# Patient Record
Sex: FEMALE | Race: BLACK OR AFRICAN AMERICAN | NOT HISPANIC OR LATINO | Employment: FULL TIME | URBAN - METROPOLITAN AREA
[De-identification: names, ages, dates, MRNs, and addresses within clinical notes are randomized per-mention and may not be internally consistent; named-entity substitution may affect disease eponyms.]

---

## 2023-03-09 LAB — SARS-COV-2 RESULT: DETECTED

## 2023-11-08 DIAGNOSIS — E78.00 HIGH CHOLESTEROL: Primary | ICD-10-CM

## 2023-11-08 DIAGNOSIS — M79.671 RIGHT FOOT PAIN: ICD-10-CM

## 2023-11-10 ENCOUNTER — ANCILLARY PROCEDURE (OUTPATIENT)
Dept: RADIOLOGY | Facility: CLINIC | Age: 53
End: 2023-11-10
Payer: COMMERCIAL

## 2023-11-10 DIAGNOSIS — M79.671 RIGHT FOOT PAIN: ICD-10-CM

## 2023-11-10 PROCEDURE — 73620 X-RAY EXAM OF FOOT: CPT | Mod: RIGHT SIDE | Performed by: RADIOLOGY

## 2023-11-10 PROCEDURE — 73620 X-RAY EXAM OF FOOT: CPT | Mod: RT

## 2023-11-19 ENCOUNTER — HOSPITAL ENCOUNTER (OUTPATIENT)
Dept: RADIOLOGY | Facility: HOSPITAL | Age: 53
Discharge: HOME | End: 2023-11-19
Payer: COMMERCIAL

## 2023-11-19 DIAGNOSIS — E78.00 HIGH CHOLESTEROL: ICD-10-CM

## 2023-11-19 PROCEDURE — 75571 CT HRT W/O DYE W/CA TEST: CPT

## 2024-05-30 ENCOUNTER — OFFICE VISIT (OUTPATIENT)
Dept: PRIMARY CARE | Facility: HOSPITAL | Age: 54
End: 2024-05-30
Payer: COMMERCIAL

## 2024-05-30 ENCOUNTER — PHARMACY VISIT (OUTPATIENT)
Dept: PHARMACY | Facility: CLINIC | Age: 54
End: 2024-05-30
Payer: COMMERCIAL

## 2024-05-30 VITALS
TEMPERATURE: 97.5 F | DIASTOLIC BLOOD PRESSURE: 84 MMHG | SYSTOLIC BLOOD PRESSURE: 148 MMHG | HEART RATE: 68 BPM | OXYGEN SATURATION: 100 % | WEIGHT: 236.8 LBS | BODY MASS INDEX: 43.58 KG/M2 | HEIGHT: 62 IN

## 2024-05-30 DIAGNOSIS — J06.9 VIRAL URI WITH COUGH: Primary | ICD-10-CM

## 2024-05-30 DIAGNOSIS — J02.9 SORE THROAT: ICD-10-CM

## 2024-05-30 PROCEDURE — 99212 OFFICE O/P EST SF 10 MIN: CPT | Performed by: STUDENT IN AN ORGANIZED HEALTH CARE EDUCATION/TRAINING PROGRAM

## 2024-05-30 PROCEDURE — RXMED WILLOW AMBULATORY MEDICATION CHARGE

## 2024-05-30 PROCEDURE — 99212 OFFICE O/P EST SF 10 MIN: CPT | Mod: GC | Performed by: STUDENT IN AN ORGANIZED HEALTH CARE EDUCATION/TRAINING PROGRAM

## 2024-05-30 PROCEDURE — 1036F TOBACCO NON-USER: CPT | Performed by: STUDENT IN AN ORGANIZED HEALTH CARE EDUCATION/TRAINING PROGRAM

## 2024-05-30 RX ORDER — LEVALBUTEROL TARTRATE 45 UG/1
1-2 AEROSOL, METERED ORAL EVERY 6 HOURS PRN
COMMUNITY
Start: 2024-04-09

## 2024-05-30 RX ORDER — OMEPRAZOLE 20 MG/1
20 CAPSULE, DELAYED RELEASE ORAL
COMMUNITY
Start: 2014-02-07

## 2024-05-30 RX ORDER — ROSUVASTATIN CALCIUM 40 MG/1
1 TABLET, COATED ORAL NIGHTLY
COMMUNITY
Start: 2024-03-15

## 2024-05-30 RX ORDER — METOPROLOL SUCCINATE 100 MG/1
100 TABLET, EXTENDED RELEASE ORAL DAILY
COMMUNITY
Start: 2024-02-16 | End: 2025-02-16

## 2024-05-30 RX ORDER — FUROSEMIDE 40 MG/1
1 TABLET ORAL DAILY
COMMUNITY
Start: 2014-09-24

## 2024-05-30 RX ORDER — HYDROQUINONE 40 MG/G
CREAM TOPICAL 2 TIMES DAILY
COMMUNITY
Start: 2024-02-09

## 2024-05-30 RX ORDER — POTASSIUM CHLORIDE 20 MEQ/1
20 TABLET, EXTENDED RELEASE ORAL DAILY
COMMUNITY
Start: 2024-04-23

## 2024-05-30 RX ORDER — LIDOCAINE 40 MG/G
CREAM TOPICAL 3 TIMES DAILY PRN
COMMUNITY
Start: 2023-08-09

## 2024-05-30 RX ORDER — LORATADINE 10 MG/1
10 TABLET ORAL DAILY
COMMUNITY
Start: 2024-02-10

## 2024-05-30 RX ORDER — MONTELUKAST SODIUM 10 MG/1
10 TABLET ORAL NIGHTLY
COMMUNITY

## 2024-05-30 RX ORDER — GLUCOSAM/CHONDRO/HERB 149/HYAL 750-100 MG
2000 TABLET ORAL 2 TIMES DAILY
COMMUNITY
Start: 2023-08-15

## 2024-05-30 RX ORDER — IBUPROFEN 800 MG/1
800 TABLET ORAL EVERY 6 HOURS PRN
COMMUNITY
Start: 2024-04-09

## 2024-05-30 RX ORDER — EZETIMIBE 10 MG/1
1 TABLET ORAL DAILY
COMMUNITY
Start: 2023-09-15 | End: 2024-09-15

## 2024-05-30 RX ORDER — NITROGLYCERIN 0.4 MG/1
0.4 TABLET SUBLINGUAL EVERY 5 MIN PRN
COMMUNITY
Start: 2014-09-24

## 2024-05-30 RX ORDER — VIT C/E/ZN/COPPR/LUTEIN/ZEAXAN 250MG-90MG
50 CAPSULE ORAL DAILY
COMMUNITY
Start: 2024-04-23 | End: 2024-11-27

## 2024-05-30 RX ORDER — FLUTICASONE PROPIONATE 50 MCG
2 SPRAY, SUSPENSION (ML) NASAL DAILY
COMMUNITY
Start: 2023-11-27 | End: 2024-11-27

## 2024-05-30 RX ORDER — SIMETHICONE 80 MG
80 TABLET,CHEWABLE ORAL EVERY 6 HOURS PRN
COMMUNITY
Start: 2024-04-29 | End: 2025-04-30

## 2024-05-30 RX ORDER — SPIRONOLACTONE 25 MG/1
1 TABLET ORAL 2 TIMES DAILY
COMMUNITY
Start: 2014-02-19 | End: 2025-03-16

## 2024-05-30 RX ORDER — TRAMADOL HYDROCHLORIDE 50 MG/1
1 TABLET ORAL EVERY 6 HOURS PRN
COMMUNITY
Start: 2015-06-01

## 2024-05-30 ASSESSMENT — PATIENT HEALTH QUESTIONNAIRE - PHQ9
1. LITTLE INTEREST OR PLEASURE IN DOING THINGS: NOT AT ALL
SUM OF ALL RESPONSES TO PHQ9 QUESTIONS 1 AND 2: 0
2. FEELING DOWN, DEPRESSED OR HOPELESS: NOT AT ALL

## 2024-05-30 ASSESSMENT — ENCOUNTER SYMPTOMS
OCCASIONAL FEELINGS OF UNSTEADINESS: 0
DEPRESSION: 0
LOSS OF SENSATION IN FEET: 0

## 2024-05-30 ASSESSMENT — PAIN SCALES - GENERAL: PAINLEVEL: 0-NO PAIN

## 2024-05-30 NOTE — PROGRESS NOTES
"Primary Care Internal Medicine Clinic Note     Subjective   Here for urgent same-day visit for sore throat.    Started this morning at 2am, Pain with swallowing   Taking motrin and tylenol  Pain is improved with pain meds and tea   Pain in the right ear as well   Cough from drainage d/t congestion   No fevers, does feel hot sometimes  No new joint pains  No SOB    With her 3yo nephew a couple days ago and he had a fever the next day, treating him for croup and AOM    Has second part of stress test tomorrow so limited in what she can eat    Strep rapid test was negative in clinic today   Never had GAS before    Not drinking or eating as much as usual  Urinated once today   Held lasix because worried about dehydration          Objective   Visit Vitals  /84   Pulse 68   Temp 36.4 °C (97.5 °F) (Temporal)   Ht 1.562 m (5' 1.5\")   Wt 107 kg (236 lb 12.8 oz)   SpO2 100%   BMI 44.02 kg/m²   Smoking Status Never   BSA 2.15 m²       Physical exam:  Gen: well-appearing, NAD  Head and neck: NCAT, neck supple without LAD  HEENT: MMM, normal nose with congestion, TMs pearly gray bilaterally   CV: Regular rate  Pulm: normal WOB on RA  Ext: WWP, no LE edema  Neuro: alert and oriented x3, normal tone, face symmetric, moves all extremities spontaneously      Medications:  Current Outpatient Medications   Medication Instructions    benzocaine (HurriCaine) 20 % 1 spray, Mouth/Throat, 4 times daily PRN    cholecalciferol (VITAMIN D-3) 50 mcg, oral, Daily    cyclobenzaprine HCl (CYCLOBENZAPRINE ORAL) 10 mg, oral, As needed    dextromethorphan-guaifenesin (Mucinex DM)  mg 12 hr tablet 1 tablet, oral, Every 12 hours PRN, Do not crush, chew, or split.    ezetimibe (Zetia) 10 mg tablet 1 tablet, oral, Daily    fluticasone (Flonase) 50 mcg/actuation nasal spray 2 sprays, Each Nostril, Daily    furosemide (Lasix) 40 mg tablet 1 tablet, oral, Daily    hydroquinone 4 % cream Topical, 2 times daily    ibuprofen 800 mg, oral, Every 6 " hours PRN    lidocaine 4 % cream Topical, 3 times daily PRN    loratadine (CLARITIN) 10 mg, oral, Daily    metoprolol succinate XL (TOPROL-XL) 100 mg, oral, Daily    montelukast (SINGULAIR) 10 mg, oral, Nightly    nitroglycerin (NITROSTAT) 0.4 mg, sublingual, Every 5 min PRN    omega 3-dha-epa-fish oil (Fish OiL) 1,000 mg (120 mg-180 mg) capsule 2,000 mg, oral, 2 times daily    omeprazole (PRILOSEC) 20 mg, oral, Daily before breakfast    potassium chloride CR 20 mEq ER tablet 20 mEq, oral, Daily    rosuvastatin (Crestor) 40 mg tablet 1 tablet, oral, Nightly    simethicone (MYLICON) 80 mg, oral, Every 6 hours PRN    sodium chloride (Ocean) 0.65 % nasal spray 1 spray, Each Nostril, As needed    spironolactone (Aldactone) 25 mg tablet 1 tablet, oral, 2 times daily    traMADol (Ultram) 50 mg tablet 1 tablet, oral, Every 6 hours PRN    vitamin E, dl,tocopheryl acet, (VITAMIN E, DL, ACETATE, ORAL) 1 tablet, oral, Daily    Xopenex HFA 45 mcg/actuation inhaler 1-2 puffs, inhalation, Every 6 hours PRN       Allergies:  Allergies   Allergen Reactions    Benazepril Anaphylaxis, Cough and Swelling    Bacitracin Zinc-Polymyxin B Hives and Other    Lisinopril Cough and Other    Oxycodone-Acetaminophen Constipation and Itching    Propoxyphene N-Acetaminophen Hallucinations    Propoxyphene-Acetaminophen Psychosis    Tuberculin, Purified Protein Derivative Other    Codeine Rash, GI Upset, Headache, Nausea Only, Other and Nausea/vomiting    Hydromorphone Hallucinations, Itching and Rash    Iodinated Contrast Media Hives and Rash    Meperidine Hives and Other    Oxycodone Rash    Propoxyphene Rash    Typhoid Vaccines Itching, Other and Rash             Assessment/Plan    53 y.o. female with HTN, asthma, and NICK who presents for same-day visit for sore throat.   No signs/symptoms of bacterial infection. Rapid strep neg in office today. Pain well controlled with tylenol/ibuprofen. Patient is well-appearing, well-hydrated, and tolerating  PO fluids. She cannot use NSAIDs because of her stress test tomorrow, so will rx benzocaine spray. Recommended against using alpha agonists given her HTN history. Return precautions (double worsening, SOB, lasting longer than 10 days, severe dehydration) were discussed.    She appears euvolemic. She held her lasix today which was wise. Recommend she push fluids for UOP 3-4 times per day. She can hold lasix today and tomorrow but should watch her volume status. She is comfortable judging when she needs it.     Seen with attending Dr. López Burnett MD  Med-Peds Resident, PGY-3  [facility]  P [phone]  F [phone]

## 2024-05-30 NOTE — PATIENT INSTRUCTIONS
This is most likely a viral URI with cough.     Keep hydrated with fluids. Aim to go pee at least 3-4 times per day. Take tylenol 650 mg every 6 hours and use the throat spray as needed. Avoid pseudoephedrine and phenylephrine - they will raise your blood pressure. Use mucinex DM instead. I expect this illness to last 4-6 days.     Please call if symptoms last longer than 10 days - we may need to give you antibiotics.     Go to the ED if you develop trouble breathing, are not able to keep fluids down or any acute worsening of symptoms.

## 2024-06-01 RX ORDER — FLUCONAZOLE 150 MG/1
150 TABLET ORAL ONCE
Qty: 1 TABLET | Refills: 0 | Status: SHIPPED | OUTPATIENT
Start: 2024-06-01 | End: 2024-06-01

## 2024-06-01 RX ORDER — AMOXICILLIN AND CLAVULANATE POTASSIUM 875; 125 MG/1; MG/1
875 TABLET, FILM COATED ORAL 2 TIMES DAILY
Qty: 20 TABLET | Refills: 0 | Status: SHIPPED | OUTPATIENT
Start: 2024-06-01 | End: 2024-06-01 | Stop reason: SDUPTHER

## 2024-06-01 RX ORDER — FLUCONAZOLE 150 MG/1
150 TABLET ORAL ONCE
Qty: 1 TABLET | Refills: 0 | Status: SHIPPED | OUTPATIENT
Start: 2024-06-01 | End: 2024-06-01 | Stop reason: SDUPTHER

## 2024-06-01 RX ORDER — AMOXICILLIN AND CLAVULANATE POTASSIUM 875; 125 MG/1; MG/1
875 TABLET, FILM COATED ORAL 2 TIMES DAILY
Qty: 20 TABLET | Refills: 0 | Status: SHIPPED | OUTPATIENT
Start: 2024-06-01 | End: 2024-06-11

## 2024-06-05 NOTE — PROGRESS NOTES
I saw and evaluated the patient. I personally obtained the key and critical portions of the history and physical exam or was physically present for key and critical portions performed by the resident/fellow. I reviewed the resident/fellow's documentation and discussed the patient with the resident/fellow. I agree with the resident/fellow's medical decision making as documented in the note.    Saadia Villalta MD MPH

## 2024-06-12 ENCOUNTER — LAB (OUTPATIENT)
Dept: LAB | Facility: LAB | Age: 54
End: 2024-06-12
Payer: COMMERCIAL

## 2024-06-12 DIAGNOSIS — I10 HYPERTENSION, UNSPECIFIED TYPE: ICD-10-CM

## 2024-06-12 DIAGNOSIS — I10 HYPERTENSION, UNSPECIFIED TYPE: Primary | ICD-10-CM

## 2024-06-12 LAB — CRP SERPL-MCNC: 0.56 MG/DL

## 2024-06-12 PROCEDURE — 86140 C-REACTIVE PROTEIN: CPT

## 2024-06-19 ENCOUNTER — APPOINTMENT (OUTPATIENT)
Dept: OPHTHALMOLOGY | Facility: CLINIC | Age: 54
End: 2024-06-19
Payer: COMMERCIAL

## 2024-06-19 DIAGNOSIS — H52.13 MYOPIA OF BOTH EYES: Primary | ICD-10-CM

## 2024-06-19 DIAGNOSIS — H52.223 REGULAR ASTIGMATISM OF BOTH EYES: ICD-10-CM

## 2024-06-19 DIAGNOSIS — H52.4 PRESBYOPIA: ICD-10-CM

## 2024-06-19 ASSESSMENT — ENCOUNTER SYMPTOMS
ENDOCRINE NEGATIVE: 0
CONSTITUTIONAL NEGATIVE: 0
EYES NEGATIVE: 0
NEUROLOGICAL NEGATIVE: 0
GASTROINTESTINAL NEGATIVE: 0
HEMATOLOGIC/LYMPHATIC NEGATIVE: 0
PSYCHIATRIC NEGATIVE: 0
CARDIOVASCULAR NEGATIVE: 0
ALLERGIC/IMMUNOLOGIC NEGATIVE: 0
RESPIRATORY NEGATIVE: 0
MUSCULOSKELETAL NEGATIVE: 0

## 2024-06-19 ASSESSMENT — TONOMETRY
OD_IOP_MMHG: 13
IOP_METHOD: GOLDMANN APPLANATION
OS_IOP_MMHG: 14

## 2024-06-19 ASSESSMENT — CUP TO DISC RATIO
OS_RATIO: 0.3
OD_RATIO: 0.25

## 2024-06-19 ASSESSMENT — REFRACTION
OS_ADD: +2.00
OD_AXIS: 075
OD_SPHERE: -4.75
OS_SPHERE: -3.75
OS_AXIS: 090
OD_ADD: +2.00
OS_CYLINDER: -0.75
OD_CYLINDER: -0.50

## 2024-06-19 ASSESSMENT — EXTERNAL EXAM - RIGHT EYE: OD_EXAM: NORMAL

## 2024-06-19 ASSESSMENT — VISUAL ACUITY
OD_CC+: -1
CORRECTION_TYPE: GLASSES
METHOD: SNELLEN - LINEAR
OD_CC: 20/20
OS_CC: 20/30

## 2024-06-19 ASSESSMENT — REFRACTION_WEARINGRX
OD_SPHERE: -5.25
OS_SPHERE: -4.25
OD_ADD: +1.75
OS_AXIS: 085
OS_CYLINDER: -0.50
OS_ADD: +1.75
OD_CYLINDER: -0.50
OD_AXIS: 080

## 2024-06-19 ASSESSMENT — CONF VISUAL FIELD
OD_INFERIOR_NASAL_RESTRICTION: 0
OS_SUPERIOR_TEMPORAL_RESTRICTION: 0
OS_INFERIOR_NASAL_RESTRICTION: 0
OS_SUPERIOR_NASAL_RESTRICTION: 0
OS_INFERIOR_TEMPORAL_RESTRICTION: 0
OD_NORMAL: 1
OD_SUPERIOR_NASAL_RESTRICTION: 0
OS_NORMAL: 1
METHOD: COUNTING FINGERS
OD_INFERIOR_TEMPORAL_RESTRICTION: 0
OD_SUPERIOR_TEMPORAL_RESTRICTION: 0

## 2024-06-19 ASSESSMENT — EXTERNAL EXAM - LEFT EYE: OS_EXAM: NORMAL

## 2024-06-19 ASSESSMENT — REFRACTION_MANIFEST
OD_AXIS: 075
OS_ADD: +2.00
OD_SPHERE: -4.50
OS_SPHERE: -3.50
OD_CYLINDER: -0.50
OS_CYLINDER: -0.75
OS_AXIS: 090
OD_ADD: +2.00

## 2024-06-19 ASSESSMENT — SLIT LAMP EXAM - LIDS
COMMENTS: CLEAN AND CLEAR
COMMENTS: CLEAN AND CLEAR

## 2024-06-19 NOTE — PROGRESS NOTES
Assessment/Plan   Diagnoses and all orders for this visit:  Myopia of both eyes  Regular astigmatism of both eyes  Presbyopia  New spec rx released today per patient request. Ocular health wnl for age OU. Monitor 1 year or sooner prn. Refraction billed today.  Ok to take glasses off for reading.

## 2024-08-13 DIAGNOSIS — I10 HYPERTENSION, UNSPECIFIED TYPE: Primary | ICD-10-CM

## 2024-08-15 ENCOUNTER — LAB (OUTPATIENT)
Dept: LAB | Facility: LAB | Age: 54
End: 2024-08-15
Payer: COMMERCIAL

## 2024-08-15 DIAGNOSIS — I10 HYPERTENSION, UNSPECIFIED TYPE: ICD-10-CM

## 2024-08-15 LAB — CRP SERPL-MCNC: 0.35 MG/DL

## 2024-08-15 PROCEDURE — 86140 C-REACTIVE PROTEIN: CPT

## 2024-10-29 DIAGNOSIS — I10 HYPERTENSION, UNSPECIFIED TYPE: Primary | ICD-10-CM

## 2024-10-31 ENCOUNTER — LAB (OUTPATIENT)
Dept: LAB | Facility: LAB | Age: 54
End: 2024-10-31
Payer: COMMERCIAL

## 2024-10-31 DIAGNOSIS — I10 HYPERTENSION, UNSPECIFIED TYPE: ICD-10-CM

## 2024-10-31 LAB — CRP SERPL-MCNC: 0.39 MG/DL

## 2024-10-31 PROCEDURE — 86140 C-REACTIVE PROTEIN: CPT

## 2025-01-20 ENCOUNTER — PHARMACY VISIT (OUTPATIENT)
Dept: PHARMACY | Facility: CLINIC | Age: 55
End: 2025-01-20
Payer: COMMERCIAL

## 2025-01-20 DIAGNOSIS — J45.901 EXACERBATION OF ASTHMA, UNSPECIFIED ASTHMA SEVERITY, UNSPECIFIED WHETHER PERSISTENT (HHS-HCC): Primary | ICD-10-CM

## 2025-01-20 PROCEDURE — RXMED WILLOW AMBULATORY MEDICATION CHARGE

## 2025-01-20 RX ORDER — IPRATROPIUM BROMIDE AND ALBUTEROL SULFATE 2.5; .5 MG/3ML; MG/3ML
3 SOLUTION RESPIRATORY (INHALATION) 4 TIMES DAILY PRN
Qty: 360 ML | Refills: 0 | Status: SHIPPED | OUTPATIENT
Start: 2025-01-20 | End: 2025-02-19

## 2025-01-25 ENCOUNTER — OFFICE VISIT (OUTPATIENT)
Dept: URGENT CARE | Age: 55
End: 2025-01-25
Payer: COMMERCIAL

## 2025-01-25 ENCOUNTER — APPOINTMENT (OUTPATIENT)
Dept: URGENT CARE | Age: 55
End: 2025-01-25
Payer: COMMERCIAL

## 2025-01-25 VITALS
RESPIRATION RATE: 20 BRPM | OXYGEN SATURATION: 95 % | BODY MASS INDEX: 44.18 KG/M2 | SYSTOLIC BLOOD PRESSURE: 158 MMHG | HEIGHT: 61 IN | HEART RATE: 78 BPM | WEIGHT: 234 LBS | DIASTOLIC BLOOD PRESSURE: 97 MMHG | TEMPERATURE: 99.9 F

## 2025-01-25 DIAGNOSIS — J06.9 VIRAL UPPER RESPIRATORY TRACT INFECTION: Primary | ICD-10-CM

## 2025-01-25 DIAGNOSIS — J40 BRONCHITIS: Primary | ICD-10-CM

## 2025-01-25 RX ORDER — PREDNISONE 20 MG/1
TABLET ORAL
Qty: 10 TABLET | Refills: 0 | Status: SHIPPED | OUTPATIENT
Start: 2025-01-25

## 2025-01-25 RX ORDER — DOXYCYCLINE 100 MG/1
100 CAPSULE ORAL 2 TIMES DAILY
Qty: 20 CAPSULE | Refills: 0 | Status: SHIPPED | OUTPATIENT
Start: 2025-01-25 | End: 2025-02-04

## 2025-01-25 RX ORDER — BENZONATATE 200 MG/1
200 CAPSULE ORAL 3 TIMES DAILY PRN
Qty: 21 CAPSULE | Refills: 0 | Status: SHIPPED | OUTPATIENT
Start: 2025-01-25

## 2025-01-25 ASSESSMENT — ENCOUNTER SYMPTOMS
MYALGIAS: 0
HEMOPTYSIS: 0
COUGH: 1
CARDIOVASCULAR NEGATIVE: 1
WHEEZING: 1
SHORTNESS OF BREATH: 0
SORE THROAT: 0
ENDOCRINE NEGATIVE: 1
EYES NEGATIVE: 1
ALLERGIC/IMMUNOLOGIC NEGATIVE: 1
FEVER: 0
HEMATOLOGIC/LYMPHATIC NEGATIVE: 1
SORE THROAT: 0
RHINORRHEA: 1
MUSCULOSKELETAL NEGATIVE: 1
SHORTNESS OF BREATH: 0
HEADACHES: 0
PSYCHIATRIC NEGATIVE: 1
CHILLS: 0
SWEATS: 0
CHEST TIGHTNESS: 1
COUGH: 1
HEARTBURN: 0
FEVER: 0
WEIGHT LOSS: 0
NEUROLOGICAL NEGATIVE: 1

## 2025-01-25 NOTE — PROGRESS NOTES
Urgent Care Virtual Video Visit    Patient Location: [facility] [place]  Provider Location: [facility]    Patient complains of a cough for about a week. She states that she has a duoneb without relief. She is worried about a chest infection. Her sinuses are yellow and dripping down into the chest and her asthma meds are not working. She takes medications as directed. PMH of diastolic heart failure and asthma. No current smoking.   She bringing up green mucous with the cough. +Wheezing. Denies sob. 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 Admits to soreness in her chest due to coughing.     Video visit completed with realtime synchronous video/audio connection. Informed consent was obtained from the patient. Patient was made aware that my evaluation and diagnosis are limited due to the fact that we are not in the same room during the interview and that this is a virtual encounter that took place via videoconferencing. Patient verbalized understanding.     Patient disposition: Sent to [facility] [facility] for further evaluation of possible pneumonia.     Electronically signed by Virtual Urgent Care  9:55 AM

## 2025-01-25 NOTE — PATIENT INSTRUCTIONS
Use your asthma inhaler and nebulizer rx as directed  Pcp follow up this week if not improving or worsening  ER visit anytime 24/7 for acute worsening or changing condition

## 2025-01-25 NOTE — PROGRESS NOTES
Subjective   Patient ID: Christine Reyes is a 54 y.o. female. They present today with a chief complaint of Cough (Chest congestion /1x wk) and Nasal Congestion.    History of Present Illness    History provided by:  Patient   used: No    Cough  Pertinent negatives include no ear pain, fever, sore throat or shortness of breath.     This is a 54 yr old female here for respiratory sxs. Cough productive of yellow/green sputum, chest congestion, and green nasal congestion x 1 week. No fever, dyspnea, sore throat or ear pain. Non smoker. Hx of asthma.  Past Medical History  Allergies as of 01/25/2025 - Reviewed 01/25/2025   Allergen Reaction Noted    Benazepril Anaphylaxis, Cough, and Swelling 01/29/2008    Bacitracin zinc-polymyxin b Hives and Other 05/30/2024    Lisinopril Cough and Other 09/02/2016    Oxycodone-acetaminophen Constipation and Itching 09/20/2011    Propoxyphene n-acetaminophen Hallucinations 05/30/2024    Propoxyphene-acetaminophen Psychosis 05/30/2024    Tuberculin, purified protein derivative Other 05/30/2024    Codeine Rash, GI Upset, Headache, Nausea Only, Other, and Nausea/vomiting 01/29/2008    Hydromorphone Hallucinations, Itching, and Rash 09/02/2016    Iodinated contrast media Hives and Rash 09/01/2020    Meperidine Hives and Other 10/06/2017    Oxycodone Rash 09/02/2016    Propoxyphene Rash 09/02/2016    Typhoid vaccines Itching, Other, and Rash 05/30/2024       (Not in a hospital admission)       Past Medical History:   Diagnosis Date    Abnormal cytological findings in specimens from other organs, systems and tissues     Pap smear, abnormal    Allergy status to unspecified drugs, medicaments and biological substances     History of seasonal allergies    Gestational (pregnancy-induced) hypertension without significant proteinuria, unspecified trimester ([facility])     Gestational hypertension    Myopia, unspecified eye 08/05/2016    Myopia with astigmatism and presbyopia     Otalgia, unspecified ear 11/10/2014    Chronic ear pain    Other conditions influencing health status     History of pregnancy    Other muscle spasm 11/10/2014    Neck muscle spasm    Personal history of diseases of the skin and subcutaneous tissue 11/10/2014    History of chronic eczema    Personal history of other complications of pregnancy, childbirth and the puerperium     History of spontaneous     Personal history of other diseases of the female genital tract     History of menorrhagia    Personal history of other diseases of the female genital tract     History of premenstrual syndrome    Personal history of other diseases of the female genital tract     Personal history of dysmenorrhea    Personal history of other diseases of the musculoskeletal system and connective tissue 11/10/2014    Personal history of gout    Personal history of other diseases of the nervous system and sense organs     History of sleep apnea    Personal history of other diseases of the respiratory system 11/10/2014    Personal history of asthma    Personal history of other endocrine, nutritional and metabolic disease     History of hypercholesterolemia    Postcoital and contact bleeding     Bleeding after intercourse     , unspecified weeks of gestation ([facility])     Premature births       Past Surgical History:   Procedure Laterality Date    BREAST SURGERY  04/15/2014    Breast Surgery Reduction Procedure    OTHER SURGICAL HISTORY  04/15/2014    Cervix Cryosurgery    OTHER SURGICAL HISTORY  2015    Hysterectomy Robotic-Assisted    TUBAL LIGATION  04/15/2014    Tubal Ligation        reports that she has never smoked. She has never used smokeless tobacco. She reports that she does not drink alcohol and does not use drugs.    Review of Systems  Review of Systems   Constitutional:  Negative for fever.   HENT:  Positive for congestion. Negative for ear pain and sore throat.    Eyes: Negative.    Respiratory:   "Positive for cough and chest tightness. Negative for shortness of breath.    Cardiovascular: Negative.    Endocrine: Negative.    Genitourinary: Negative.    Musculoskeletal: Negative.    Skin: Negative.    Allergic/Immunologic: Negative.    Neurological: Negative.    Hematological: Negative.    Psychiatric/Behavioral: Negative.     All other systems reviewed and are negative.      Objective    Vitals:    01/25/25 1017   BP: (!) 158/97   Pulse: 78   Resp: 20   Temp: 37.7 °C (99.9 °F)   TempSrc: Oral   SpO2: 95%   Weight: 106 kg (234 lb)   Height: 1.549 m (5' 1\")     No LMP recorded (exact date). Patient has had a hysterectomy.    Physical Exam  Vitals and nursing note reviewed.   Constitutional:       Appearance: Normal appearance.   HENT:      Head: Normocephalic and atraumatic.      Right Ear: Tympanic membrane and ear canal normal.      Left Ear: Tympanic membrane and ear canal normal.      Mouth/Throat:      Mouth: Mucous membranes are moist.      Pharynx: Oropharynx is clear.   Cardiovascular:      Rate and Rhythm: Normal rate and regular rhythm.   Pulmonary:      Effort: Pulmonary effort is normal.      Breath sounds: Normal breath sounds.   Musculoskeletal:      Cervical back: Neck supple.   Lymphadenopathy:      Cervical: No cervical adenopathy.   Skin:     General: Skin is warm and dry.   Neurological:      General: No focal deficit present.      Mental Status: She is alert and oriented to person, place, and time.   Psychiatric:         Mood and Affect: Mood normal.         Behavior: Behavior normal.       Procedures    Point of Care Test & Imaging Results from this visit  No results found for this visit on 01/25/25.   No results found.    Diagnostic study results (if any) were reviewed by Ingrid Pitts PA-C.    Assessment/Plan   Allergies, medications, history, and pertinent labs/EKGs/Imaging reviewed by Ingrid Pitts PA-C.       Orders and Diagnoses  Diagnoses and all orders for this " visit:  Bronchitis  -     doxycycline (Monodox) 100 mg capsule; Take 1 capsule (100 mg) by mouth 2 times a day for 10 days. Take with at least 8 ounces (large glass) of water, do not lie down for 30 minutes after  -     predniSONE (Deltasone) 20 mg tablet; 2 po daily x 5 days  -     benzonatate (Tessalon) 200 mg capsule; Take 1 capsule (200 mg) by mouth 3 times a day as needed for cough. Do not crush or chew.    Plan:  Meds as directed  Use your asthma meds as directed  Fluids and rest  Pcp follow up this week if not improving or worsening  ER visit anytime 24/7 for acute worsening or changing condition    Patient disposition: Home    Electronically signed by Ingrid Pitts PA-C  11:20 AM

## 2025-03-18 ENCOUNTER — HOSPITAL ENCOUNTER (OUTPATIENT)
Dept: RADIOLOGY | Facility: HOSPITAL | Age: 55
Discharge: HOME | End: 2025-03-18
Payer: COMMERCIAL

## 2025-03-18 DIAGNOSIS — M79.641 RIGHT HAND PAIN: ICD-10-CM

## 2025-03-18 DIAGNOSIS — M79.641 RIGHT HAND PAIN: Primary | ICD-10-CM

## 2025-03-18 PROCEDURE — 73130 X-RAY EXAM OF HAND: CPT | Mod: RT

## 2025-03-31 ENCOUNTER — OFFICE VISIT (OUTPATIENT)
Dept: ENDOCRINOLOGY | Facility: HOSPITAL | Age: 55
End: 2025-03-31
Payer: COMMERCIAL

## 2025-03-31 VITALS
WEIGHT: 238 LBS | HEART RATE: 60 BPM | OXYGEN SATURATION: 100 % | BODY MASS INDEX: 43.79 KG/M2 | SYSTOLIC BLOOD PRESSURE: 156 MMHG | DIASTOLIC BLOOD PRESSURE: 90 MMHG | HEIGHT: 62 IN | TEMPERATURE: 95.9 F

## 2025-03-31 DIAGNOSIS — E23.6 EMPTY SELLA (MULTI): Primary | ICD-10-CM

## 2025-03-31 PROCEDURE — 99214 OFFICE O/P EST MOD 30 MIN: CPT | Performed by: STUDENT IN AN ORGANIZED HEALTH CARE EDUCATION/TRAINING PROGRAM

## 2025-03-31 PROCEDURE — 3008F BODY MASS INDEX DOCD: CPT | Performed by: STUDENT IN AN ORGANIZED HEALTH CARE EDUCATION/TRAINING PROGRAM

## 2025-03-31 PROCEDURE — 99204 OFFICE O/P NEW MOD 45 MIN: CPT | Performed by: STUDENT IN AN ORGANIZED HEALTH CARE EDUCATION/TRAINING PROGRAM

## 2025-03-31 ASSESSMENT — PAIN SCALES - GENERAL: PAINLEVEL_OUTOF10: 5

## 2025-03-31 NOTE — PROGRESS NOTES
Subjective   Patient ID: Christine Reyes is a 54 y.o. female who presents for New Patient Visit.  HPI  Mrs. King with hx of HFpEF, Afib, Asthma, GERD, HTN?, HLD, hx of low potassium (now on spironolactone), diverticulosis and diverticulitis  Hx of bronchitis and pneumonia  Had recent bronchitis? and recent abx and Gcs. Prednisone 20 mg 2 tabs daily for 5 days. Receives GC oral once year approximately  Hasn't needed her inhaler since the infection in Jan/Feb    Headache: Once a month more temporal are towards the back usually couple days motrin and tylenol helps  Had floaters no with improving   Double vision: No  Peripheral vision: Not changes  Galactorrhea: No  Energy: Tired, loves naps. If in a car if not driving she would fall asleep  Sleep: Has sleep apnea. Not using her CPAP recently.   Weight: No major change in weight. No recent water pill.   Hold around 10 lbs fluids  On spironolactone twice a day  Was told she has high aldosterone  Had pregnancy induced HTN was started on hydrochlorothiazide.   Had prior Cts no reports showing adrenal issues  BM: Constipation, had colonoscopy after prep was not cleared miralax and metamucil  Appetite: Not good. Does not feel hungry. Hiatal hernia and umbilical hernia. Does not want to eat. Recent getting worse.   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 Takes Vitamin D 10,000IU once daily  Hot flashes: No  Night sweats: No  Had hysterectomy a while ago.  Still has her ovaries    Meds:  Reviewed  Vitamin D  Vitamin C   CoQ10  Black seed oil    Fhx:  No thyroid     Review of Systems    Objective   Physical Exam    Assessment/Plan   {Assess/PlanSmartLinks:56131}         Kameron Leung MD 03/31/25 8:56 AM    "(5' 1.5\")   Wt 108 kg (238 lb)   SpO2 100%   BMI 44.24 kg/m²   OB Status Hysterectomy   Smoking Status Never   BSA 2.16 m²      Physical Exam  Constitutional:       General: She is not in acute distress.     Appearance: Normal appearance. She is obese.   HENT:      Head: Normocephalic and atraumatic.   Eyes:      Extraocular Movements: Extraocular movements intact.      Pupils: Pupils are equal, round, and reactive to light.   Neck:      Comments: Thyroid gland palpable, no lad  Cardiovascular:      Rate and Rhythm: Normal rate and regular rhythm.   Pulmonary:      Effort: Pulmonary effort is normal. No respiratory distress.      Breath sounds: Normal breath sounds.   Abdominal:      General: Bowel sounds are normal.      Palpations: Abdomen is soft.      Tenderness: There is no abdominal tenderness.   Skin:     Coloration: Skin is not jaundiced or pale.      Findings: No erythema or rash.   Neurological:      General: No focal deficit present.      Mental Status: She is alert and oriented to person, place, and time.      Deep Tendon Reflexes: Reflexes normal.   Psychiatric:         Mood and Affect: Mood normal.         Behavior: Behavior normal.       No recent labs done.  [facility] I reviewed the reports      Assessment/Plan   Mrs. King is a 54 year old female with hx of HFpEF, Afib, Asthma, GERD, HTN?, HLD, hx of low potassium (now on spironolactone), diverticulosis and diverticulitis coming for evaluation of partially empty sella seen on CT [facility].   Had recent bronchitis? and recent abx and Gcs. Prednisone 20 mg 2 tabs daily for 5 days. Receives GC oral once year approximately  Hasn't needed her inhaler since the infection in Jan/Feb  Headache: Once a month more temporal are towards the back usually couple days motrin and tylenol helps  Has sleep apnea. Not using her CPAP recently.   Weight: No major change in weight.   On spironolactone twice a day  Was told she has " high aldosterone  Proximal muscle weakness:  Yes sometimes. 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

## 2025-04-04 LAB
ACTH PLAS-MCNC: 27 PG/ML (ref 6–50)
ALBUMIN SERPL-MCNC: 4.2 G/DL (ref 3.6–5.1)
BUN SERPL-MCNC: 12 MG/DL (ref 7–25)
BUN/CREAT SERPL: NORMAL (CALC) (ref 6–22)
CALCIUM SERPL-MCNC: 9.2 MG/DL (ref 8.6–10.4)
CHLORIDE SERPL-SCNC: 106 MMOL/L (ref 98–110)
CO2 SERPL-SCNC: 28 MMOL/L (ref 20–32)
CORTIS SERPL-MCNC: 8.4 MCG/DL
CREAT SERPL-MCNC: 0.74 MG/DL (ref 0.5–1.03)
DHEA-S SERPL-MCNC: 87 MCG/DL (ref 5–167)
EGFRCR SERPLBLD CKD-EPI 2021: 96 ML/MIN/1.73M2
ESTRADIOL SERPL-MCNC: 23 PG/ML
FSH SERPL-ACNC: 71 MIU/ML
GLUCOSE SERPL-MCNC: 94 MG/DL (ref 65–99)
IGF-I SERPL-MCNC: NORMAL NG/ML
IGF-I Z-SCORE SERPL: NORMAL
IGF-I Z-SCORE SERPL: NORMAL
LH SERPL-ACNC: 32.4 MIU/ML
PHOSPHATE SERPL-MCNC: 4.3 MG/DL (ref 2.5–4.5)
POTASSIUM SERPL-SCNC: 4 MMOL/L (ref 3.5–5.3)
PROLACTIN SERPL-MCNC: 4.9 NG/ML
SODIUM SERPL-SCNC: 143 MMOL/L (ref 135–146)
T4 FREE SERPL-MCNC: 1.1 NG/DL (ref 0.8–1.8)
TSH SERPL-ACNC: 1.15 MIU/L

## 2025-04-07 LAB
ACTH PLAS-MCNC: 27 PG/ML (ref 6–50)
ALBUMIN SERPL-MCNC: 4.2 G/DL (ref 3.6–5.1)
BUN SERPL-MCNC: 12 MG/DL (ref 7–25)
BUN/CREAT SERPL: NORMAL (CALC) (ref 6–22)
CALCIUM SERPL-MCNC: 9.2 MG/DL (ref 8.6–10.4)
CHLORIDE SERPL-SCNC: 106 MMOL/L (ref 98–110)
CO2 SERPL-SCNC: 28 MMOL/L (ref 20–32)
CORTIS SERPL-MCNC: 8.4 MCG/DL
CREAT SERPL-MCNC: 0.74 MG/DL (ref 0.5–1.03)
DHEA-S SERPL-MCNC: 87 MCG/DL (ref 5–167)
EGFRCR SERPLBLD CKD-EPI 2021: 96 ML/MIN/1.73M2
ESTRADIOL SERPL-MCNC: 23 PG/ML
FSH SERPL-ACNC: 71 MIU/ML
GLUCOSE SERPL-MCNC: 94 MG/DL (ref 65–99)
IGF-I SERPL-MCNC: 92 NG/ML (ref 50–317)
IGF-I Z-SCORE SERPL: -0.8 SD
LH SERPL-ACNC: 32.4 MIU/ML
PHOSPHATE SERPL-MCNC: 4.3 MG/DL (ref 2.5–4.5)
POTASSIUM SERPL-SCNC: 4 MMOL/L (ref 3.5–5.3)
PROLACTIN SERPL-MCNC: 4.9 NG/ML
SODIUM SERPL-SCNC: 143 MMOL/L (ref 135–146)
T4 FREE SERPL-MCNC: 1.1 NG/DL (ref 0.8–1.8)
TSH SERPL-ACNC: 1.15 MIU/L

## 2025-06-24 ENCOUNTER — APPOINTMENT (OUTPATIENT)
Dept: OPHTHALMOLOGY | Facility: CLINIC | Age: 55
End: 2025-06-24
Payer: COMMERCIAL

## 2025-06-24 DIAGNOSIS — H52.223 REGULAR ASTIGMATISM OF BOTH EYES: ICD-10-CM

## 2025-06-24 DIAGNOSIS — H52.4 PRESBYOPIA: ICD-10-CM

## 2025-06-24 DIAGNOSIS — H52.13 MYOPIA OF BOTH EYES: Primary | ICD-10-CM

## 2025-06-24 PROBLEM — G47.33 OSA (OBSTRUCTIVE SLEEP APNEA): Status: ACTIVE | Noted: 2025-06-24

## 2025-06-24 ASSESSMENT — SLIT LAMP EXAM - LIDS
COMMENTS: CLEAN AND CLEAR
COMMENTS: CLEAN AND CLEAR

## 2025-06-24 ASSESSMENT — CUP TO DISC RATIO
OS_RATIO: 0.3
OD_RATIO: 0.25

## 2025-06-24 ASSESSMENT — TONOMETRY
OS_IOP_MMHG: 13
OD_IOP_MMHG: 12
IOP_METHOD: GOLDMANN APPLANATION

## 2025-06-24 ASSESSMENT — ENCOUNTER SYMPTOMS
CARDIOVASCULAR NEGATIVE: 0
EYES NEGATIVE: 1
ENDOCRINE NEGATIVE: 0
HEMATOLOGIC/LYMPHATIC NEGATIVE: 0
GASTROINTESTINAL NEGATIVE: 0
RESPIRATORY NEGATIVE: 0
MUSCULOSKELETAL NEGATIVE: 0
PSYCHIATRIC NEGATIVE: 0
CONSTITUTIONAL NEGATIVE: 0
ALLERGIC/IMMUNOLOGIC NEGATIVE: 0
NEUROLOGICAL NEGATIVE: 0

## 2025-06-24 ASSESSMENT — VISUAL ACUITY
OD_CC: 20/25
METHOD: SNELLEN - LINEAR
OS_CC+: -2
OD_CC+: -2
OS_CC: 20/20
CORRECTION_TYPE: GLASSES

## 2025-06-24 ASSESSMENT — REFRACTION_MANIFEST
OD_AXIS: 080
OS_SPHERE: -4.75
OD_ADD: +1.75
OS_ADD: +1.75
OD_CYLINDER: -0.25
OD_SPHERE: -5.75
OS_CYLINDER: -0.25
OS_AXIS: 087

## 2025-06-24 ASSESSMENT — CONF VISUAL FIELD
OS_NORMAL: 1
OD_INFERIOR_TEMPORAL_RESTRICTION: 0
OS_INFERIOR_TEMPORAL_RESTRICTION: 0
OD_NORMAL: 1
OS_INFERIOR_NASAL_RESTRICTION: 0
OD_SUPERIOR_NASAL_RESTRICTION: 0
OD_SUPERIOR_TEMPORAL_RESTRICTION: 0
OS_SUPERIOR_NASAL_RESTRICTION: 0
METHOD: COUNTING FINGERS
OD_INFERIOR_NASAL_RESTRICTION: 0
OS_SUPERIOR_TEMPORAL_RESTRICTION: 0

## 2025-06-24 ASSESSMENT — REFRACTION_WEARINGRX
OD_AXIS: 080
OD_CYLINDER: -0.50
OS_CYLINDER: -0.50
OD_SPHERE: -5.25
OS_SPHERE: -4.25
OD_ADD: +1.75
OS_AXIS: 085
OS_ADD: +1.75

## 2025-06-24 ASSESSMENT — REFRACTION
OD_AXIS: 080
OS_SPHERE: -4.50
OS_CYLINDER: -0.50
OD_SPHERE: -5.75
OS_AXIS: 087
OS_ADD: +1.75
OD_ADD: +1.75
OD_CYLINDER: -0.50

## 2025-06-24 ASSESSMENT — EXTERNAL EXAM - LEFT EYE: OS_EXAM: NORMAL

## 2025-06-24 ASSESSMENT — EXTERNAL EXAM - RIGHT EYE: OD_EXAM: NORMAL

## 2025-06-24 NOTE — PROGRESS NOTES
Assessment/Plan   Diagnoses and all orders for this visit:  Myopia of both eyes  -     Return visit; Future  Regular astigmatism of both eyes  Presbyopia  New spec rx released today per patient request. Ocular health wnl for age OU. Monitor 1 year or sooner prn. Refraction billed today. Pt consents to receiving glasses Rx today. Patient's/guardian's signature obtained to acknowledge and confirm that a paper copy of glasses Rx was given to patient in compliance with [facility] Eyeglass Rule. Electronic copy of Rx will also be available via [facility]/EPIC.

## 2026-06-29 ENCOUNTER — APPOINTMENT (OUTPATIENT)
Dept: OPHTHALMOLOGY | Facility: CLINIC | Age: 56
End: 2026-06-29
Payer: COMMERCIAL